# Patient Record
Sex: MALE | Race: WHITE | NOT HISPANIC OR LATINO | URBAN - METROPOLITAN AREA
[De-identification: names, ages, dates, MRNs, and addresses within clinical notes are randomized per-mention and may not be internally consistent; named-entity substitution may affect disease eponyms.]

---

## 2025-04-21 ENCOUNTER — APPOINTMENT (OUTPATIENT)
Dept: RADIOLOGY | Facility: HOSPITAL | Age: 35
End: 2025-04-21
Payer: COMMERCIAL

## 2025-04-21 ENCOUNTER — HOSPITAL ENCOUNTER (INPATIENT)
Facility: HOSPITAL | Age: 35
LOS: 2 days | Discharge: HOME | End: 2025-04-23
Attending: STUDENT IN AN ORGANIZED HEALTH CARE EDUCATION/TRAINING PROGRAM | Admitting: DENTIST
Payer: COMMERCIAL

## 2025-04-21 DIAGNOSIS — K04.7 DENTAL ABSCESS: ICD-10-CM

## 2025-04-21 DIAGNOSIS — K08.89 PAIN, DENTAL: Primary | ICD-10-CM

## 2025-04-21 LAB
ALBUMIN SERPL BCP-MCNC: 4.3 G/DL (ref 3.4–5)
ALP SERPL-CCNC: 64 U/L (ref 33–120)
ALT SERPL W P-5'-P-CCNC: 16 U/L (ref 10–52)
ANION GAP SERPL CALC-SCNC: 14 MMOL/L (ref 10–20)
AST SERPL W P-5'-P-CCNC: 14 U/L (ref 9–39)
BASOPHILS # BLD AUTO: 0.05 X10*3/UL (ref 0–0.1)
BASOPHILS NFR BLD AUTO: 0.4 %
BILIRUB SERPL-MCNC: 0.5 MG/DL (ref 0–1.2)
BUN SERPL-MCNC: 11 MG/DL (ref 6–23)
CALCIUM SERPL-MCNC: 9.5 MG/DL (ref 8.6–10.6)
CHLORIDE SERPL-SCNC: 104 MMOL/L (ref 98–107)
CO2 SERPL-SCNC: 26 MMOL/L (ref 21–32)
CREAT SERPL-MCNC: 0.73 MG/DL (ref 0.5–1.3)
EGFRCR SERPLBLD CKD-EPI 2021: >90 ML/MIN/1.73M*2
EOSINOPHIL # BLD AUTO: 0.03 X10*3/UL (ref 0–0.7)
EOSINOPHIL NFR BLD AUTO: 0.2 %
ERYTHROCYTE [DISTWIDTH] IN BLOOD BY AUTOMATED COUNT: 12.9 % (ref 11.5–14.5)
GLUCOSE SERPL-MCNC: 86 MG/DL (ref 74–99)
HCT VFR BLD AUTO: 40.4 % (ref 41–52)
HGB BLD-MCNC: 14.3 G/DL (ref 13.5–17.5)
HOLD SPECIMEN: NORMAL
IMM GRANULOCYTES # BLD AUTO: 0.25 X10*3/UL (ref 0–0.7)
IMM GRANULOCYTES NFR BLD AUTO: 1.8 % (ref 0–0.9)
LYMPHOCYTES # BLD AUTO: 1.57 X10*3/UL (ref 1.2–4.8)
LYMPHOCYTES NFR BLD AUTO: 11 %
MAGNESIUM SERPL-MCNC: 2.1 MG/DL (ref 1.6–2.4)
MCH RBC QN AUTO: 29.2 PG (ref 26–34)
MCHC RBC AUTO-ENTMCNC: 35.4 G/DL (ref 32–36)
MCV RBC AUTO: 83 FL (ref 80–100)
MONOCYTES # BLD AUTO: 0.86 X10*3/UL (ref 0.1–1)
MONOCYTES NFR BLD AUTO: 6.1 %
NEUTROPHILS # BLD AUTO: 11.45 X10*3/UL (ref 1.2–7.7)
NEUTROPHILS NFR BLD AUTO: 80.5 %
NRBC BLD-RTO: 0 /100 WBCS (ref 0–0)
PLATELET # BLD AUTO: 441 X10*3/UL (ref 150–450)
POTASSIUM SERPL-SCNC: 3.9 MMOL/L (ref 3.5–5.3)
PROT SERPL-MCNC: 7.7 G/DL (ref 6.4–8.2)
RBC # BLD AUTO: 4.89 X10*6/UL (ref 4.5–5.9)
SODIUM SERPL-SCNC: 140 MMOL/L (ref 136–145)
WBC # BLD AUTO: 14.2 X10*3/UL (ref 4.4–11.3)

## 2025-04-21 PROCEDURE — 70487 CT MAXILLOFACIAL W/DYE: CPT

## 2025-04-21 PROCEDURE — 2500000001 HC RX 250 WO HCPCS SELF ADMINISTERED DRUGS (ALT 637 FOR MEDICARE OP)

## 2025-04-21 PROCEDURE — 96365 THER/PROPH/DIAG IV INF INIT: CPT

## 2025-04-21 PROCEDURE — 2500000004 HC RX 250 GENERAL PHARMACY W/ HCPCS (ALT 636 FOR OP/ED): Mod: JZ

## 2025-04-21 PROCEDURE — 96376 TX/PRO/DX INJ SAME DRUG ADON: CPT

## 2025-04-21 PROCEDURE — 83735 ASSAY OF MAGNESIUM: CPT

## 2025-04-21 PROCEDURE — 2500000004 HC RX 250 GENERAL PHARMACY W/ HCPCS (ALT 636 FOR OP/ED)

## 2025-04-21 PROCEDURE — 99285 EMERGENCY DEPT VISIT HI MDM: CPT | Mod: 25 | Performed by: STUDENT IN AN ORGANIZED HEALTH CARE EDUCATION/TRAINING PROGRAM

## 2025-04-21 PROCEDURE — 80053 COMPREHEN METABOLIC PANEL: CPT

## 2025-04-21 PROCEDURE — 0J910ZZ DRAINAGE OF FACE SUBCUTANEOUS TISSUE AND FASCIA, OPEN APPROACH: ICD-10-PCS

## 2025-04-21 PROCEDURE — 36415 COLL VENOUS BLD VENIPUNCTURE: CPT

## 2025-04-21 PROCEDURE — 85025 COMPLETE CBC W/AUTO DIFF WBC: CPT

## 2025-04-21 PROCEDURE — 99285 EMERGENCY DEPT VISIT HI MDM: CPT

## 2025-04-21 PROCEDURE — 96361 HYDRATE IV INFUSION ADD-ON: CPT

## 2025-04-21 PROCEDURE — 70491 CT SOFT TISSUE NECK W/DYE: CPT

## 2025-04-21 PROCEDURE — 96375 TX/PRO/DX INJ NEW DRUG ADDON: CPT

## 2025-04-21 PROCEDURE — 2550000001 HC RX 255 CONTRASTS: Performed by: STUDENT IN AN ORGANIZED HEALTH CARE EDUCATION/TRAINING PROGRAM

## 2025-04-21 PROCEDURE — 1210000001 HC SEMI-PRIVATE ROOM DAILY

## 2025-04-21 RX ORDER — ENOXAPARIN SODIUM 100 MG/ML
30 INJECTION SUBCUTANEOUS EVERY 12 HOURS
Status: DISCONTINUED | OUTPATIENT
Start: 2025-04-21 | End: 2025-04-23 | Stop reason: HOSPADM

## 2025-04-21 RX ORDER — TRIPROLIDINE/PSEUDOEPHEDRINE 2.5MG-60MG
600 TABLET ORAL 3 TIMES DAILY
Status: DISCONTINUED | OUTPATIENT
Start: 2025-04-21 | End: 2025-04-23 | Stop reason: HOSPADM

## 2025-04-21 RX ORDER — ONDANSETRON HYDROCHLORIDE 2 MG/ML
INJECTION, SOLUTION INTRAVENOUS
Status: COMPLETED
Start: 2025-04-21 | End: 2025-04-21

## 2025-04-21 RX ORDER — MORPHINE SULFATE 4 MG/ML
2 INJECTION INTRAVENOUS ONCE
Status: COMPLETED | OUTPATIENT
Start: 2025-04-21 | End: 2025-04-21

## 2025-04-21 RX ORDER — IBUPROFEN 600 MG/1
600 TABLET ORAL 3 TIMES DAILY
Status: DISCONTINUED | OUTPATIENT
Start: 2025-04-21 | End: 2025-04-23 | Stop reason: HOSPADM

## 2025-04-21 RX ORDER — ONDANSETRON HYDROCHLORIDE 2 MG/ML
4 INJECTION, SOLUTION INTRAVENOUS ONCE
Status: COMPLETED | OUTPATIENT
Start: 2025-04-21 | End: 2025-04-21

## 2025-04-21 RX ORDER — KETOROLAC TROMETHAMINE 15 MG/ML
15 INJECTION, SOLUTION INTRAMUSCULAR; INTRAVENOUS ONCE
Status: COMPLETED | OUTPATIENT
Start: 2025-04-21 | End: 2025-04-21

## 2025-04-21 RX ORDER — ACETAMINOPHEN 160 MG/5ML
650 SOLUTION ORAL EVERY 6 HOURS
Status: DISCONTINUED | OUTPATIENT
Start: 2025-04-21 | End: 2025-04-23 | Stop reason: HOSPADM

## 2025-04-21 RX ORDER — DEXAMETHASONE SODIUM PHOSPHATE 10 MG/ML
10 INJECTION INTRAMUSCULAR; INTRAVENOUS ONCE
Status: COMPLETED | OUTPATIENT
Start: 2025-04-21 | End: 2025-04-21

## 2025-04-21 RX ORDER — MORPHINE SULFATE 4 MG/ML
4 INJECTION INTRAVENOUS ONCE
Status: COMPLETED | OUTPATIENT
Start: 2025-04-21 | End: 2025-04-21

## 2025-04-21 RX ORDER — ACETAMINOPHEN 325 MG/1
650 TABLET ORAL EVERY 6 HOURS
Status: DISCONTINUED | OUTPATIENT
Start: 2025-04-21 | End: 2025-04-23 | Stop reason: HOSPADM

## 2025-04-21 RX ORDER — MORPHINE SULFATE 4 MG/ML
INJECTION INTRAVENOUS
Status: COMPLETED
Start: 2025-04-21 | End: 2025-04-21

## 2025-04-21 RX ADMIN — ACETAMINOPHEN 650 MG: 325 TABLET, FILM COATED ORAL at 21:33

## 2025-04-21 RX ADMIN — DEXAMETHASONE SODIUM PHOSPHATE 8 MG: 4 INJECTION, SOLUTION INTRA-ARTICULAR; INTRALESIONAL; INTRAMUSCULAR; INTRAVENOUS; SOFT TISSUE at 21:32

## 2025-04-21 RX ADMIN — SODIUM CHLORIDE, SODIUM LACTATE, POTASSIUM CHLORIDE, AND CALCIUM CHLORIDE 1000 ML: .6; .31; .03; .02 INJECTION, SOLUTION INTRAVENOUS at 13:07

## 2025-04-21 RX ADMIN — MORPHINE SULFATE 2 MG: 4 INJECTION, SOLUTION INTRAMUSCULAR; INTRAVENOUS at 15:36

## 2025-04-21 RX ADMIN — ONDANSETRON 4 MG: 2 INJECTION INTRAMUSCULAR; INTRAVENOUS at 18:12

## 2025-04-21 RX ADMIN — IOHEXOL 75 ML: 350 INJECTION, SOLUTION INTRAVENOUS at 14:11

## 2025-04-21 RX ADMIN — AMPICILLIN SODIUM AND SULBACTAM SODIUM 3 G: 2; 1 INJECTION, POWDER, FOR SOLUTION INTRAMUSCULAR; INTRAVENOUS at 21:32

## 2025-04-21 RX ADMIN — DEXAMETHASONE SODIUM PHOSPHATE 10 MG: 10 INJECTION INTRAMUSCULAR; INTRAVENOUS at 13:14

## 2025-04-21 RX ADMIN — IBUPROFEN 600 MG: 600 TABLET ORAL at 21:33

## 2025-04-21 RX ADMIN — KETOROLAC TROMETHAMINE 15 MG: 15 INJECTION, SOLUTION INTRAMUSCULAR; INTRAVENOUS at 13:14

## 2025-04-21 RX ADMIN — MORPHINE SULFATE 4 MG: 4 INJECTION INTRAVENOUS at 18:12

## 2025-04-21 RX ADMIN — ONDANSETRON HYDROCHLORIDE 4 MG: 2 INJECTION, SOLUTION INTRAVENOUS at 18:12

## 2025-04-21 RX ADMIN — ENOXAPARIN SODIUM 30 MG: 100 INJECTION SUBCUTANEOUS at 21:33

## 2025-04-21 RX ADMIN — AMPICILLIN SODIUM AND SULBACTAM SODIUM 3 G: 2; 1 INJECTION, POWDER, FOR SOLUTION INTRAMUSCULAR; INTRAVENOUS at 15:36

## 2025-04-21 ASSESSMENT — ENCOUNTER SYMPTOMS
TROUBLE SWALLOWING: 0
CHILLS: 0
FEVER: 0
SORE THROAT: 0
FACIAL SWELLING: 1
SHORTNESS OF BREATH: 0

## 2025-04-21 ASSESSMENT — PAIN - FUNCTIONAL ASSESSMENT
PAIN_FUNCTIONAL_ASSESSMENT: 0-10

## 2025-04-21 ASSESSMENT — PAIN SCALES - GENERAL
PAINLEVEL_OUTOF10: 0 - NO PAIN
PAINLEVEL_OUTOF10: 5 - MODERATE PAIN
PAINLEVEL_OUTOF10: 2
PAINLEVEL_OUTOF10: 4

## 2025-04-21 ASSESSMENT — COLUMBIA-SUICIDE SEVERITY RATING SCALE - C-SSRS
1. IN THE PAST MONTH, HAVE YOU WISHED YOU WERE DEAD OR WISHED YOU COULD GO TO SLEEP AND NOT WAKE UP?: NO
6. HAVE YOU EVER DONE ANYTHING, STARTED TO DO ANYTHING, OR PREPARED TO DO ANYTHING TO END YOUR LIFE?: NO
2. HAVE YOU ACTUALLY HAD ANY THOUGHTS OF KILLING YOURSELF?: NO

## 2025-04-21 ASSESSMENT — PAIN DESCRIPTION - LOCATION
LOCATION: MOUTH
LOCATION: MOUTH

## 2025-04-21 ASSESSMENT — PAIN DESCRIPTION - PAIN TYPE
TYPE: ACUTE PAIN
TYPE: ACUTE PAIN

## 2025-04-21 NOTE — ED PROVIDER NOTES
HPI   Chief Complaint   Patient presents with    Dental Pain       35-year-old male presents for chief complaint of facial swelling.  Had dental work performed late last week for root canal.  Developed some swelling and pain afterward and was placed on antibiotic amoxicillin with Medrol Dosepak on Friday.  States that the pain and swelling increased initially and then improved after the medication but the trismus began to worsen over the last day or 2.  Much worse today with increased pain in the left submandibular area.  Denies any difficulty breathing or swallowing but states that it is frustrating not being able to open his mouth all the way.  Endorses some mild pain and swelling.  7/10 currently pain level.  Denies any chest pain or dyspnea.  No fevers or chills or myalgia.              Patient History   Medical History[1]  Surgical History[2]  Family History[3]  Social History[4]    Physical Exam   ED Triage Vitals [04/21/25 1239]   Temperature Heart Rate Respirations BP   36.7 °C (98.1 °F) 78 16 148/89      Pulse Ox Temp src Heart Rate Source Patient Position   100 % -- -- --      BP Location FiO2 (%)     -- --       Physical Exam  Vitals and nursing note reviewed.   Constitutional:       General: He is not in acute distress.     Appearance: He is well-developed.   HENT:      Head: Normocephalic and atraumatic.      Comments: Tenderness and fullness to the left submandibular area without crepitus.  He has trismus present significantly and it is difficult to visualize his mouth.  No stridor.  No adventitious lung sounds.  Speaks in full sentences without difficulty.  No drooling.  Patent airway at this point.  Eyes:      Conjunctiva/sclera: Conjunctivae normal.   Cardiovascular:      Rate and Rhythm: Normal rate and regular rhythm.      Heart sounds: No murmur heard.  Pulmonary:      Effort: Pulmonary effort is normal. No respiratory distress.      Breath sounds: Normal breath sounds.   Abdominal:       Palpations: Abdomen is soft.      Tenderness: There is no abdominal tenderness.   Musculoskeletal:         General: No swelling.      Cervical back: Neck supple.   Skin:     General: Skin is warm and dry.      Capillary Refill: Capillary refill takes less than 2 seconds.   Neurological:      Mental Status: He is alert.   Psychiatric:         Mood and Affect: Mood normal.           ED Course & MDM   Diagnoses as of 04/21/25 1817   Pain, dental   Dental abscess                 No data recorded     Ash Coma Scale Score: 15 (04/21/25 1238 : Sheryl Stauffer RN)                           Medical Decision Making  35-year-old male presents for chief complaint of facial swelling.  Had dental work performed late last week for root canal.  Developed some swelling and pain afterward and was placed on antibiotic amoxicillin with Medrol Dosepak on Friday.  States that the pain and swelling increased initially and then improved after the medication but the trismus began to worsen over the last day or 2.  Much worse today with increased pain in the left submandibular area.  Denies any difficulty breathing or swallowing but states that it is frustrating not being able to open his mouth all the way.  Endorses some mild pain and swelling.  7/10 currently pain level.  Denies any chest pain or dyspnea.  No fevers or chills or myalgia.    Vital signs reviewed, unremarkable at this time.  Patient overall appears to be in some discomfort but is in no apparent distress.  Speaks full sentences without difficulty.  Trismus present on exam but without stridor or drooling.  Diagnostic testing was performed.  Given Toradol, Decadron, LR bolus for symptom management.  Leukocytosis noted on CBC with differential.  Labs otherwise unremarkable including CMP and magnesium.  CT soft tissue neck and facial bones with IV contrast showed periapical lucency at the left mandibular second molar with cortical dehiscence of the lingual aspect of the  mandible.  There is adjacent subperiosteal abscess with in the submandibular space.  Questionable slight anterior subluxation of the TMJ, right greater than left.  [facility]FS was consulted.  Patient was given additional pain medication with morphine.  A bit later he was given more morphine and Zofran for potential nausea after [facility]FS came to assess and manipulate him.    [facility]FS were able to get back to us quickly.  Recommend admitting to their service for further antibiotics and potential OR for drainage of the abscess.  Patient in agreement with this plan.  States that overall he feels improved and has no further needs.  He remained calm and cooperative in a stable condition for the duration of my shift.  Awaiting inpatient bed.        Procedure  Procedures         [1] No past medical history on file.  [2] No past surgical history on file.  [3] No family history on file.  [4]   Social History  Tobacco Use    Smoking status: Not on file    Smokeless tobacco: Not on file   Substance Use Topics    Alcohol use: Not on file    Drug use: Not on file        Tanvir Goncalves, NILAM-CNP  04/21/25 2728

## 2025-04-21 NOTE — H&P
"History Of Present Illness  Bruce Stauffer is a 35 y.o. male presenting with left facial swelling. Patient reports hx of RCT completed 5/15. He reports increase in pain upon chewing. On 5/18 patient reported decreased pain but increase in facial swelling and opening. He denies any fever or purulent drainage. He reports fullness/swelling is closer to tongue. Patient has no difficulty breathing, trouble swallowing or managing secretions.      Past Medical History  Denies    Surgical History  Hernia repair as a child. No complications with anaesthesia.      Social History  Uses tobacco substitute daily. Denies Illicit drug use. Occasional EtOH     Allergies  NKDA    Review of Systems   Constitutional:  Negative for chills and fever.   HENT:  Positive for dental problem and facial swelling. Negative for mouth sores, sore throat and trouble swallowing.    Respiratory:  Negative for shortness of breath.         Physical Exam  HENT:      Head:      Comments: Patient has mild left sided swelling. Inferior border left mandible non-palpable. No extraoral drainage appreciated. CN7 marginal mandibular branch intact.      Nose: Nose normal.      Mouth/Throat:      Mouth: Mucous membranes are moist.      Comments: Patient has limited ABRAHAN <20mm guarding. Patient 35mm upon expansion. Patient has left sublingual swelling with palpable fluid collection along the lingual of #18. #18 has RCT prep and temporary cement. No pain to palpation of #20,19,18 to percussion or palpation. No buccal abscess or tenderness.   Eyes:      Conjunctiva/sclera: Conjunctivae normal.   Cardiovascular:      Comments: Warm and well perfused.   Pulmonary:      Comments: Breathing comfortably on room air.   Skin:     General: Skin is warm.   Neurological:      Mental Status: He is alert.          Last Recorded Vitals  Blood pressure 136/89, pulse 74, temperature 36.7 °C (98.1 °F), temperature source Temporal, resp. rate 16, height 1.753 m (5' 9\"), weight 74.8 kg " (165 lb), SpO2 98%.    Relevant Results  Results for orders placed or performed during the hospital encounter of 04/21/25 (from the past 24 hours)   CBC and Auto Differential   Result Value Ref Range    WBC 14.2 (H) 4.4 - 11.3 x10*3/uL    nRBC 0.0 0.0 - 0.0 /100 WBCs    RBC 4.89 4.50 - 5.90 x10*6/uL    Hemoglobin 14.3 13.5 - 17.5 g/dL    Hematocrit 40.4 (L) 41.0 - 52.0 %    MCV 83 80 - 100 fL    MCH 29.2 26.0 - 34.0 pg    MCHC 35.4 32.0 - 36.0 g/dL    RDW 12.9 11.5 - 14.5 %    Platelets 441 150 - 450 x10*3/uL    Neutrophils % 80.5 40.0 - 80.0 %    Immature Granulocytes %, Automated 1.8 (H) 0.0 - 0.9 %    Lymphocytes % 11.0 13.0 - 44.0 %    Monocytes % 6.1 2.0 - 10.0 %    Eosinophils % 0.2 0.0 - 6.0 %    Basophils % 0.4 0.0 - 2.0 %    Neutrophils Absolute 11.45 (H) 1.20 - 7.70 x10*3/uL    Immature Granulocytes Absolute, Automated 0.25 0.00 - 0.70 x10*3/uL    Lymphocytes Absolute 1.57 1.20 - 4.80 x10*3/uL    Monocytes Absolute 0.86 0.10 - 1.00 x10*3/uL    Eosinophils Absolute 0.03 0.00 - 0.70 x10*3/uL    Basophils Absolute 0.05 0.00 - 0.10 x10*3/uL   Magnesium   Result Value Ref Range    Magnesium 2.10 1.60 - 2.40 mg/dL   Comprehensive metabolic panel   Result Value Ref Range    Glucose 86 74 - 99 mg/dL    Sodium 140 136 - 145 mmol/L    Potassium 3.9 3.5 - 5.3 mmol/L    Chloride 104 98 - 107 mmol/L    Bicarbonate 26 21 - 32 mmol/L    Anion Gap 14 10 - 20 mmol/L    Urea Nitrogen 11 6 - 23 mg/dL    Creatinine 0.73 0.50 - 1.30 mg/dL    eGFR >90 >60 mL/min/1.73m*2    Calcium 9.5 8.6 - 10.6 mg/dL    Albumin 4.3 3.4 - 5.0 g/dL    Alkaline Phosphatase 64 33 - 120 U/L    Total Protein 7.7 6.4 - 8.2 g/dL    AST 14 9 - 39 U/L    Bilirubin, Total 0.5 0.0 - 1.2 mg/dL    ALT 16 10 - 52 U/L   SST TOP   Result Value Ref Range    Extra Tube Hold for add-ons.     Interpreted By:  Mauricio Langford  and Efrain Cruz   STUDY:  CT SOFT TISSUE NECK W IV CONTRAST; CT FACIAL BONES W IV CONTRAST;  4/21/2025 2:11 pm       INDICATION:  Signs/Symptoms:Recent dental procedure Thursday now with trismus and  submandibular swelling and pain.      COMPARISON:  None.      ACCESSION NUMBER(S):  [id number]; [id number]      ORDERING CLINICIAN:  KAYLEEN KANG      TECHNIQUE:  Thin cut axial CT images through the facial bones were obtained and  reconstructed in the coronal and sagittal plane.      Axial CT images of the neck were obtained.  The patient received 75  ML of Omnipaque 350 intravenous contrast agent. The images were  reformatted in angled axial, coronal and sagittal planes.      FINDINGS:  CT FACIAL BONES:      Orbits: The bony orbits are intact. The orbital contents are  unremarkable.      Facial Bones: There is no displaced facial bone fracture.      Mandible/Temporomandibular Joints: There is questionable slight  anterior subluxation of the bilateral temporomandibular joints,  right-greater-than-left. There is periapical lucency at the left  mandibular 2nd molar with subtle adjacent cortical dehiscence of the  lingual aspect of the mandible (series 203, image 82). There is a rim  enhancing fluid collection along the lingual aspect of the angle of  the mandible measuring 2.6 X1.7 X2.0 cm (series 302, image 161 and  series 306, image 59).      Paranasal Sinuses/Mastoids: Visualized paranasal sinuses and mastoids  are clear.          CT SOFT TISSUE NECK:      Oral Cavity, Pharynx and Larynx:  Evaluation oral cavity is limited  by streak artifact from dental hardware. Submandibular fluid  collection described above. The hypopharyngeal and laryngeal  structures are unremarkable.      Retropharyngeal and Prevertebral Soft Tissues: Unremarkable.      Lymph nodes: Scattered left-sided reactive submandibular and level 2A  lymph nodes.      Neck vessels: Bilateral neck vessels are normal in course and caliber  and appear patent.      Thyroid gland: The thyroid gland is unremarkable in size and  appearance.      Parotid and  submandibular glands: Bilateral parotid and submandibular  glands are unremarkable in appearance.      Paranasal Sinuses and Mastoids: Visualized paranasal sinuses and  bilateral mastoids are clear.      Visualized orbital structures are unremarkable.      Visualized upper lungs are clear.      Visualized cervical spine appears unremarkable.      IMPRESSION:  1. Periapical lucency at the left mandibular 2nd molar with cortical  dehiscence of the lingual aspect of the mandible. There is adjacent  subperiosteal abscess within the submandibular space.  2. Questionable slight anterior subluxation of the temporomandibular  joints, right-greater-than-left. Further evaluation of trismus may be  considered with MRI temporomandibular joint with open and closed  mouth views.      I personally reviewed the images/study and I agree with the findings  as stated by resident physician Anthony Zuniga MD. This study was  interpreted at [facility],  [place], [place].      MACRO:  None      Signed by: Mauricio Langford 4/21/2025 2:47 PM  Dictation workstation:   [id number]     Assessment & Plan  Pain, dental    Patient is a 34 y/o male who presents to ED with right sublingual > submandibular abscess after failed root canal therapy associated with tooth #18. Patient is afebrile but leukocytotic. He is hemodynamically stable with no signs of dyspnea, dysphagia or odynophagia. Plan is to admit patient with OMFS. Continue IV antibiotics, steroids and workup surgical plan.      [place] OMFS [place] [phone] with any issues/concerns.     If any issue with contacting via pager, please contact Beatrice Arenas via [employer]iku.   2nd call to contact via Haiku is Daniel Diop  3rd call via Haiku is Nguyen Arenas DMD    [facility] PGY-1         Beatrice Arenas DMD

## 2025-04-21 NOTE — ED TRIAGE NOTES
C/o not being able to open mouth properly after dental work Friday. Given antibiotics and steroids for swelling.

## 2025-04-22 LAB
ANION GAP SERPL CALC-SCNC: 12 MMOL/L (ref 10–20)
BUN SERPL-MCNC: 11 MG/DL (ref 6–23)
CALCIUM SERPL-MCNC: 9 MG/DL (ref 8.6–10.6)
CHLORIDE SERPL-SCNC: 100 MMOL/L (ref 98–107)
CO2 SERPL-SCNC: 26 MMOL/L (ref 21–32)
CREAT SERPL-MCNC: 0.63 MG/DL (ref 0.5–1.3)
EGFRCR SERPLBLD CKD-EPI 2021: >90 ML/MIN/1.73M*2
ERYTHROCYTE [DISTWIDTH] IN BLOOD BY AUTOMATED COUNT: 12.5 % (ref 11.5–14.5)
GLUCOSE SERPL-MCNC: 144 MG/DL (ref 74–99)
HCT VFR BLD AUTO: 36.6 % (ref 41–52)
HGB BLD-MCNC: 12.9 G/DL (ref 13.5–17.5)
MCH RBC QN AUTO: 29.5 PG (ref 26–34)
MCHC RBC AUTO-ENTMCNC: 35.2 G/DL (ref 32–36)
MCV RBC AUTO: 84 FL (ref 80–100)
NRBC BLD-RTO: 0 /100 WBCS (ref 0–0)
PLATELET # BLD AUTO: 441 X10*3/UL (ref 150–450)
POTASSIUM SERPL-SCNC: 4.3 MMOL/L (ref 3.5–5.3)
RBC # BLD AUTO: 4.38 X10*6/UL (ref 4.5–5.9)
SODIUM SERPL-SCNC: 134 MMOL/L (ref 136–145)
WBC # BLD AUTO: 12.1 X10*3/UL (ref 4.4–11.3)

## 2025-04-22 PROCEDURE — 87070 CULTURE OTHR SPECIMN AEROBIC: CPT

## 2025-04-22 PROCEDURE — 36415 COLL VENOUS BLD VENIPUNCTURE: CPT

## 2025-04-22 PROCEDURE — 82374 ASSAY BLOOD CARBON DIOXIDE: CPT

## 2025-04-22 PROCEDURE — 2500000001 HC RX 250 WO HCPCS SELF ADMINISTERED DRUGS (ALT 637 FOR MEDICARE OP)

## 2025-04-22 PROCEDURE — 2500000004 HC RX 250 GENERAL PHARMACY W/ HCPCS (ALT 636 FOR OP/ED): Mod: JZ

## 2025-04-22 PROCEDURE — 1210000001 HC SEMI-PRIVATE ROOM DAILY

## 2025-04-22 PROCEDURE — 85027 COMPLETE CBC AUTOMATED: CPT

## 2025-04-22 RX ORDER — LIDOCAINE HYDROCHLORIDE 10 MG/ML
20 INJECTION, SOLUTION EPIDURAL; INFILTRATION; INTRACAUDAL; PERINEURAL ONCE
Status: DISCONTINUED | OUTPATIENT
Start: 2025-04-22 | End: 2025-04-22

## 2025-04-22 RX ORDER — LIDOCAINE HYDROCHLORIDE 10 MG/ML
20 INJECTION, SOLUTION INFILTRATION; PERINEURAL ONCE
Status: DISCONTINUED | OUTPATIENT
Start: 2025-04-22 | End: 2025-04-23 | Stop reason: HOSPADM

## 2025-04-22 RX ADMIN — AMPICILLIN SODIUM AND SULBACTAM SODIUM 3 G: 2; 1 INJECTION, POWDER, FOR SOLUTION INTRAMUSCULAR; INTRAVENOUS at 20:27

## 2025-04-22 RX ADMIN — AMPICILLIN SODIUM AND SULBACTAM SODIUM 3 G: 2; 1 INJECTION, POWDER, FOR SOLUTION INTRAMUSCULAR; INTRAVENOUS at 03:13

## 2025-04-22 RX ADMIN — DEXAMETHASONE SODIUM PHOSPHATE 8 MG: 4 INJECTION, SOLUTION INTRA-ARTICULAR; INTRALESIONAL; INTRAMUSCULAR; INTRAVENOUS; SOFT TISSUE at 14:11

## 2025-04-22 RX ADMIN — ACETAMINOPHEN 650 MG: 325 TABLET, FILM COATED ORAL at 03:13

## 2025-04-22 RX ADMIN — IBUPROFEN 600 MG: 600 TABLET ORAL at 08:57

## 2025-04-22 RX ADMIN — DEXAMETHASONE SODIUM PHOSPHATE 8 MG: 4 INJECTION, SOLUTION INTRA-ARTICULAR; INTRALESIONAL; INTRAMUSCULAR; INTRAVENOUS; SOFT TISSUE at 04:40

## 2025-04-22 RX ADMIN — AMPICILLIN SODIUM AND SULBACTAM SODIUM 3 G: 2; 1 INJECTION, POWDER, FOR SOLUTION INTRAMUSCULAR; INTRAVENOUS at 08:57

## 2025-04-22 RX ADMIN — ACETAMINOPHEN 650 MG: 325 TABLET, FILM COATED ORAL at 08:55

## 2025-04-22 RX ADMIN — ACETAMINOPHEN 650 MG: 325 TABLET, FILM COATED ORAL at 20:27

## 2025-04-22 RX ADMIN — AMPICILLIN SODIUM AND SULBACTAM SODIUM 3 G: 2; 1 INJECTION, POWDER, FOR SOLUTION INTRAMUSCULAR; INTRAVENOUS at 15:31

## 2025-04-22 RX ADMIN — IBUPROFEN 600 MG: 600 TABLET ORAL at 20:27

## 2025-04-22 ASSESSMENT — PAIN SCALES - GENERAL
PAINLEVEL_OUTOF10: 5 - MODERATE PAIN
PAINLEVEL_OUTOF10: 7
PAINLEVEL_OUTOF10: 5 - MODERATE PAIN
PAINLEVEL_OUTOF10: 7
PAINLEVEL_OUTOF10: 3
PAINLEVEL_OUTOF10: 6

## 2025-04-22 ASSESSMENT — PAIN DESCRIPTION - LOCATION: LOCATION: MOUTH

## 2025-04-22 ASSESSMENT — PAIN - FUNCTIONAL ASSESSMENT
PAIN_FUNCTIONAL_ASSESSMENT: 0-10
PAIN_FUNCTIONAL_ASSESSMENT: 0-10

## 2025-04-22 NOTE — PROGRESS NOTES
Avel Stauffer is a 35 y.o. male presenting with left facial swelling. Patient reports hx of RCT completed 4/15., however, he began to steadily experience increased pain upon chewing. On 4/18 patient reported decreased pain but increase in facial swelling and opening. He denies any fever or purulent drainage. He reports fullness/swelling is closer to tongue. Patient has no difficulty breathing, trouble swallowing or managing secretions.      Past Medical History  Denies    Surgical History  Hernia repair as a child. No complications with anaesthesia.      Social History  Uses tobacco substitute daily. Denies Illicit drug use. Occasional EtOH     Allergies  NKDA    Review of Systems   Constitutional:  Negative for chills and fever.   HENT:  Positive for dental problem and facial swelling. Negative for mouth sores, sore throat and trouble swallowing.    Respiratory:  Negative for shortness of breath.         Physical Exam:  Constitutional: AAOX3, resting comfortably in bed  NEURO: Alert and oriented x3, no gross motor or sensory deficits.  CV: RRR  PULM: CTAB, breathing comfortably on RA  GI: Abd soft, nontender, nondistended,  Skin: Warm and dry. No rashes or lesions noted.  Eyes: PERRL, EOMI. clear sclera  Head/Face: Patient has mild left sided swelling. Inferior border left mandible non-palpable. No extraoral drainage appreciated. CN7 marginal mandibular branch intact.   Mouth: Patient has limited ABRAHAN <20mm guarding. Patient 35mm upon expansion. Patient has left sublingual swelling with palpable fluid collection along the lingual of #18. #18 has RCT prep and temporary cement. No pain to palpation of #20,19,18 to percussion or palpation. No buccal abscess or tenderness.   Extremities: KASPER  PSYCH: Appropriate mood and behavior        Last Recorded Vitals  Vitals:    04/21/25 2254   BP: (!) 146/93   Pulse: 87   Resp: 17   Temp:    SpO2: 95%        Relevant Results  Results for orders placed or performed during  the hospital encounter of 04/21/25 (from the past 24 hours)   CBC and Auto Differential   Result Value Ref Range    WBC 14.2 (H) 4.4 - 11.3 x10*3/uL    nRBC 0.0 0.0 - 0.0 /100 WBCs    RBC 4.89 4.50 - 5.90 x10*6/uL    Hemoglobin 14.3 13.5 - 17.5 g/dL    Hematocrit 40.4 (L) 41.0 - 52.0 %    MCV 83 80 - 100 fL    MCH 29.2 26.0 - 34.0 pg    MCHC 35.4 32.0 - 36.0 g/dL    RDW 12.9 11.5 - 14.5 %    Platelets 441 150 - 450 x10*3/uL    Neutrophils % 80.5 40.0 - 80.0 %    Immature Granulocytes %, Automated 1.8 (H) 0.0 - 0.9 %    Lymphocytes % 11.0 13.0 - 44.0 %    Monocytes % 6.1 2.0 - 10.0 %    Eosinophils % 0.2 0.0 - 6.0 %    Basophils % 0.4 0.0 - 2.0 %    Neutrophils Absolute 11.45 (H) 1.20 - 7.70 x10*3/uL    Immature Granulocytes Absolute, Automated 0.25 0.00 - 0.70 x10*3/uL    Lymphocytes Absolute 1.57 1.20 - 4.80 x10*3/uL    Monocytes Absolute 0.86 0.10 - 1.00 x10*3/uL    Eosinophils Absolute 0.03 0.00 - 0.70 x10*3/uL    Basophils Absolute 0.05 0.00 - 0.10 x10*3/uL   Magnesium   Result Value Ref Range    Magnesium 2.10 1.60 - 2.40 mg/dL   Comprehensive metabolic panel   Result Value Ref Range    Glucose 86 74 - 99 mg/dL    Sodium 140 136 - 145 mmol/L    Potassium 3.9 3.5 - 5.3 mmol/L    Chloride 104 98 - 107 mmol/L    Bicarbonate 26 21 - 32 mmol/L    Anion Gap 14 10 - 20 mmol/L    Urea Nitrogen 11 6 - 23 mg/dL    Creatinine 0.73 0.50 - 1.30 mg/dL    eGFR >90 >60 mL/min/1.73m*2    Calcium 9.5 8.6 - 10.6 mg/dL    Albumin 4.3 3.4 - 5.0 g/dL    Alkaline Phosphatase 64 33 - 120 U/L    Total Protein 7.7 6.4 - 8.2 g/dL    AST 14 9 - 39 U/L    Bilirubin, Total 0.5 0.0 - 1.2 mg/dL    ALT 16 10 - 52 U/L   SST TOP   Result Value Ref Range    Extra Tube Hold for add-ons.    CBC   Result Value Ref Range    WBC 12.1 (H) 4.4 - 11.3 x10*3/uL    nRBC 0.0 0.0 - 0.0 /100 WBCs    RBC 4.38 (L) 4.50 - 5.90 x10*6/uL    Hemoglobin 12.9 (L) 13.5 - 17.5 g/dL    Hematocrit 36.6 (L) 41.0 - 52.0 %    MCV 84 80 - 100 fL    MCH 29.5 26.0 - 34.0 pg     MCHC 35.2 32.0 - 36.0 g/dL    RDW 12.5 11.5 - 14.5 %    Platelets 441 150 - 450 x10*3/uL   Basic Metabolic Panel   Result Value Ref Range    Glucose 144 (H) 74 - 99 mg/dL    Sodium 134 (L) 136 - 145 mmol/L    Potassium 4.3 3.5 - 5.3 mmol/L    Chloride 100 98 - 107 mmol/L    Bicarbonate 26 21 - 32 mmol/L    Anion Gap 12 10 - 20 mmol/L    Urea Nitrogen 11 6 - 23 mg/dL    Creatinine 0.63 0.50 - 1.30 mg/dL    eGFR >90 >60 mL/min/1.73m*2    Calcium 9.0 8.6 - 10.6 mg/dL    Interpreted By:  Mauricio Langford,  and Efrain Cruz   STUDY:  CT SOFT TISSUE NECK W IV CONTRAST; CT FACIAL BONES W IV CONTRAST;  4/21/2025 2:11 pm      INDICATION:  Signs/Symptoms:Recent dental procedure Thursday now with trismus and  submandibular swelling and pain.      COMPARISON:  None.      ACCESSION NUMBER(S):  [id number]; [id number]      ORDERING CLINICIAN:  KAYLEEN KANG      TECHNIQUE:  Thin cut axial CT images through the facial bones were obtained and  reconstructed in the coronal and sagittal plane.      Axial CT images of the neck were obtained.  The patient received 75  ML of Omnipaque 350 intravenous contrast agent. The images were  reformatted in angled axial, coronal and sagittal planes.      FINDINGS:  CT FACIAL BONES:      Orbits: The bony orbits are intact. The orbital contents are  unremarkable.      Facial Bones: There is no displaced facial bone fracture.      Mandible/Temporomandibular Joints: There is questionable slight  anterior subluxation of the bilateral temporomandibular joints,  right-greater-than-left. There is periapical lucency at the left  mandibular 2nd molar with subtle adjacent cortical dehiscence of the  lingual aspect of the mandible (series 203, image 82). There is a rim  enhancing fluid collection along the lingual aspect of the angle of  the mandible measuring 2.6 X1.7 X2.0 cm (series 302, image 161 and  series 306, image 59).      Paranasal Sinuses/Mastoids: Visualized paranasal sinuses and mastoids  are  clear.          CT SOFT TISSUE NECK:      Oral Cavity, Pharynx and Larynx:  Evaluation oral cavity is limited  by streak artifact from dental hardware. Submandibular fluid  collection described above. The hypopharyngeal and laryngeal  structures are unremarkable.      Retropharyngeal and Prevertebral Soft Tissues: Unremarkable.      Lymph nodes: Scattered left-sided reactive submandibular and level 2A  lymph nodes.      Neck vessels: Bilateral neck vessels are normal in course and caliber  and appear patent.      Thyroid gland: The thyroid gland is unremarkable in size and  appearance.      Parotid and submandibular glands: Bilateral parotid and submandibular  glands are unremarkable in appearance.      Paranasal Sinuses and Mastoids: Visualized paranasal sinuses and  bilateral mastoids are clear.      Visualized orbital structures are unremarkable.      Visualized upper lungs are clear.      Visualized cervical spine appears unremarkable.      IMPRESSION:  1. Periapical lucency at the left mandibular 2nd molar with cortical  dehiscence of the lingual aspect of the mandible. There is adjacent  subperiosteal abscess within the submandibular space.  2. Questionable slight anterior subluxation of the temporomandibular  joints, right-greater-than-left. Further evaluation of trismus may be  considered with MRI temporomandibular joint with open and closed  mouth views.      I personally reviewed the images/study and I agree with the findings  as stated by resident physician Anthony Zuniga MD. This study was  interpreted at [facility],  [place], [place].      MACRO:  None      Signed by: Mauricio Langford 4/21/2025 2:47 PM  Dictation workstation:   [id number]     Assessment & Plan  Pain, dental    Patient is a 34 y/o male who presents to ED with right sublingual > submandibular abscess after failed root canal therapy associated with tooth #18. Patient is afebrile but leukocytotic. He is  hemodynamically stable with no signs of dyspnea, dysphagia or odynophagia. Plan is to admit patient with OMFS, patient has not yet been transferred to the floor. Continue IV antibiotics, steroids, plan will be for incision and drainage bedside.       [place] OMFS [place] [phone] with any issues/concerns. If any issue with contacting via pager, please contact Shivam Francisco via [employer].      2nd call to contact via Haiku is Daniel Diop  3rd call via Haiku is Nguyen Francisco DMD    OMFS PGY-1

## 2025-04-22 NOTE — PROGRESS NOTES
Pharmacy Medication History Review    Bruce Stauffer is a 35 y.o. male admitted for Pain, dental. Pharmacy reviewed the patient's [id number] medications and allergies for accuracy.    The list below reflects the updated PTA list.   No Known Prior to Admission Medications         The list below reflects the updated allergy list. Please review each documented allergy for additional clarification and justification.  Allergies  Reviewed by Sheryl Stauffer RN on 4/21/2025   No Known Allergies       Patient accepts M2B at discharge.     Sources used to complete the med history include:  Patient Interview  Epic Dispense Report (Pharmacy Fill History)  Chart Review; Recent/Past Encounters; [facility]  [facility] (no recent activity found)    Additional Comments:   Recent Rx's for Amoxicillin 500 mg (LF 4/18/25, #28 / 7 day) and Medrol Dose [place] (LF 4/18/25, 6 day); otherwise, no other regular/maintenance PTA Rx Medications; Pt states has used OTC Ibuprofen and Tylenol PRN.     ----------------------------------  Brandon Sandhu, Kyle, [facility]  Transitions of Care Pharmacist  Medication reconciliation complete  Please reach out via Epic Secure Chat (7p-8o) for questions,   or if no response call [phone].

## 2025-04-23 ENCOUNTER — PHARMACY VISIT (OUTPATIENT)
Dept: PHARMACY | Facility: CLINIC | Age: 35
End: 2025-04-23
Payer: COMMERCIAL

## 2025-04-23 VITALS
SYSTOLIC BLOOD PRESSURE: 121 MMHG | HEIGHT: 69 IN | RESPIRATION RATE: 16 BRPM | OXYGEN SATURATION: 98 % | TEMPERATURE: 98.1 F | WEIGHT: 165 LBS | HEART RATE: 67 BPM | DIASTOLIC BLOOD PRESSURE: 80 MMHG | BODY MASS INDEX: 24.44 KG/M2

## 2025-04-23 DIAGNOSIS — M27.2 ABSCESS OF MANDIBLE: Primary | ICD-10-CM

## 2025-04-23 PROCEDURE — 2500000004 HC RX 250 GENERAL PHARMACY W/ HCPCS (ALT 636 FOR OP/ED)

## 2025-04-23 PROCEDURE — 2500000001 HC RX 250 WO HCPCS SELF ADMINISTERED DRUGS (ALT 637 FOR MEDICARE OP)

## 2025-04-23 RX ORDER — AMOXICILLIN AND CLAVULANATE POTASSIUM 875; 125 MG/1; MG/1
875 TABLET, FILM COATED ORAL 2 TIMES DAILY
Qty: 20 TABLET | Refills: 0 | Status: SHIPPED | OUTPATIENT
Start: 2025-04-23 | End: 2025-04-23

## 2025-04-23 RX ORDER — AMOXICILLIN AND CLAVULANATE POTASSIUM 875; 125 MG/1; MG/1
875 TABLET, FILM COATED ORAL 2 TIMES DAILY
Qty: 20 TABLET | Refills: 0 | Status: SHIPPED | OUTPATIENT
Start: 2025-04-23 | End: 2025-05-03

## 2025-04-23 RX ORDER — CHLORHEXIDINE GLUCONATE ORAL RINSE 1.2 MG/ML
15 SOLUTION DENTAL AS NEEDED
Qty: 120 ML | Refills: 0 | Status: SHIPPED | OUTPATIENT
Start: 2025-04-23 | End: 2025-05-07

## 2025-04-23 RX ORDER — CHLORHEXIDINE GLUCONATE ORAL RINSE 1.2 MG/ML
15 SOLUTION DENTAL AS NEEDED
Qty: 473 ML | Refills: 0 | Status: SHIPPED | OUTPATIENT
Start: 2025-04-23 | End: 2025-04-23

## 2025-04-23 RX ADMIN — IBUPROFEN 600 MG: 600 TABLET ORAL at 09:22

## 2025-04-23 RX ADMIN — ACETAMINOPHEN 650 MG: 325 TABLET, FILM COATED ORAL at 08:25

## 2025-04-23 RX ADMIN — AMPICILLIN SODIUM AND SULBACTAM SODIUM 3 G: 2; 1 INJECTION, POWDER, FOR SOLUTION INTRAMUSCULAR; INTRAVENOUS at 08:26

## 2025-04-23 RX ADMIN — AMPICILLIN SODIUM AND SULBACTAM SODIUM 3 G: 2; 1 INJECTION, POWDER, FOR SOLUTION INTRAMUSCULAR; INTRAVENOUS at 01:10

## 2025-04-23 RX ADMIN — ACETAMINOPHEN 650 MG: 325 TABLET, FILM COATED ORAL at 01:08

## 2025-04-23 ASSESSMENT — PAIN SCALES - GENERAL: PAINLEVEL_OUTOF10: 2

## 2025-04-23 ASSESSMENT — PAIN - FUNCTIONAL ASSESSMENT: PAIN_FUNCTIONAL_ASSESSMENT: 0-10

## 2025-04-23 NOTE — SIGNIFICANT EVENT
Bruce Stauffer is a 35 y.o. male presenting with left facial swelling. Patient reports hx of RCT completed 4/15., however, he began to steadily experience increased pain upon chewing. On 4/18 patient reported decreased pain but increase in facial swelling and opening. He denies any fever or purulent drainage. Patient reports improvement in swelling, pain levels, and ABRAHAN. Patient has no difficulty breathing, trouble swallowing or managing secretions.       Physical Exam:  Constitutional: AAOX3, resting comfortably in bed  NEURO: Alert and oriented x3, no gross motor or sensory deficits.  PULM: Breathing comfortably on RA  GI: Abd soft, nontender, nondistended,  Skin: Warm and dry. No rashes or lesions noted.  Eyes: PERRL, EOMI. clear sclera  Head/Face: Patient has mild left sided swelling. Inferior border left mandible non-palpable. No extraoral drainage appreciated. CN7 marginal mandibular branch intact.   Mouth: Patient has limited ABRAHAN 20mm guarding. Patient 35mm upon expansion. Patient has left sublingual swelling with palpable fluid collection along the lingual of #18. #18 has RCT prep and temporary cement. No pain to palpation of #20,19,18 to percussion or palpation. No buccal abscess or tenderness.   Extremities: KASPER  PSYCH: Appropriate mood and behavior      Procedure:  Bedside incision and drainage performed. 1% lidocaine used at LEONID and infiltration sites at left submandibular region. Sulcular incision at lingual aspect of left mandibular teeth with creation of FTMP using #9 periosteal elevator. Approaching lingual of #18, purulence was appreciated and culture swab was taken. A temporary crown in the lower left quadrant was deemed to be loose during the procedure and removed for potential aspiration risk. Patient reports he was aware about the temporary crown being loose and has a final crown prepared by an outside dentist. Overall, patient tolerated this procedure well.       OMFS to continue to manage while  inpatient.

## 2025-04-23 NOTE — DISCHARGE SUMMARY
Discharge Diagnosis  Pain, dental    Issues Requiring Follow-Up  Tissue culture/path    Test Results Pending At Discharge  Pending Labs       Order Current Status    Tissue/Wound Culture/Smear Preliminary result            Hospital Course   Patient presented on HOD 1 with chief complaint of left facial swelling concentrated at the mandible. [place] [facility] [place]. Patient continued on IV antibiotic and decadron therapy with improvement in symptoms. On HOD 2, bedside incision and drainage of left mandible near site #18 was performed. Patient tolerated this procedure well. No acute events overnight. On HOD 3, patient was discharged home.     Pertinent Physical Exam At Time of Discharge  Physical Exam  Constitutional: AAOX3, resting comfortably in bed  NEURO: Alert and oriented x3, no gross motor or sensory deficits.  PULM: Breathing comfortably on RA  GI: Abd soft, nontender, nondistended,  Skin: Warm and dry. No rashes or lesions noted.  Eyes: PERRL, EOMI. clear sclera  Head/Face: Patient has mild left sided swelling. Inferior border left mandible non-palpable. No extraoral drainage appreciated. CN7 marginal mandibular branch intact.   Mouth: Patient has limited ABRAHAN 20mm guarding. Patient 35mm upon expansion. Patient has left sublingual swelling with palpable fluid collection along the lingual of #18. #18 has RCT prep and temporary cement. No pain to palpation of #20,19,18 to percussion or palpation. No buccal abscess or tenderness.   Extremities: KASPER  PSYCH: Appropriate mood and behavior    Home Medications     Medication List      START taking these medications     amoxicillin-clavulanate 875-125 mg tablet; Commonly known as: Augmentin;   Take 1 tablet (875 mg) by mouth 2 times a day for 10 days.   chlorhexidine 0.12 % solution; Commonly known as: Peridex; Use 15 mL in   the mouth or throat if needed for wound care. DO NOT SWALLOW       Outpatient Follow-Up  Follow up will  occur at [facility] outpatient clinic located at Department of Oral & Maxillofacial Surgery  [address] ([employer] [facility] [employer])  [place] [id number]    Office phone number: [phone].  Office fax number: [phone].  Team Pager: [phone].  Patients can contact the [id number] through the hospital  [phone].     Adilia Whitt, DMD

## 2025-04-23 NOTE — PROGRESS NOTES
"CHASIDY Stauffer is a 35 y.o. male presenting with left facial swelling. Patient reports hx of RCT completed 4/15., however, he began to steadily experience increased pain upon chewing. On 4/18 patient reported decreased pain but increase in facial swelling and opening. Bedside incision and drainage of the left mandible was performed on 4/22/25. Patient reports improvement in swelling, pain levels, and ABRAHAN. Patient has no difficulty breathing, trouble swallowing or managing secretions.          O  Physical Exam  Physical Exam   Physical Exam:  Constitutional: AAOX3, resting comfortably in bed  NEURO: Alert and oriented x3, no gross motor or sensory deficits.  PULM: Breathing comfortably on RA  GI: Abd soft, nontender, nondistended,  Skin: Warm and dry. No rashes or lesions noted.  Eyes: PERRL, EOMI. clear sclera  Head/Face:   Patient has mild left sided facial swelling  Inferior border mandible palpable b/l   No extraoral drainage appreciated.   CN V and VII intact and equal b/l  Mouth:   ABRAHAN 25 mm  Site of incision and drainage at lingual tissues of #18 - no purulence or drainage appreciated even on manipulation - mild tenderness  #18 has RCT prep and temporary cement - temporary crown missing  No pain to palpation of #20,19,18 to percussion or palpation.   No buccal abscess or tenderness.   FOM soft non elevated b/l  Neck:  Soft to palpation b/l  Trachea midline  Extremities: KASPER  PSYCH: Appropriate mood and behavior     Last Recorded Vitals  Blood pressure 113/84, pulse 60, temperature 36.7 °C (98 °F), temperature source Temporal, resp. rate 16, height 1.753 m (5' 9\"), weight 74.8 kg (165 lb), SpO2 98%.    Relevant Results  Admission on 04/21/2025   Component Date Value Ref Range Status    WBC 04/21/2025 14.2 (H)  4.4 - 11.3 x10*3/uL Final    nRBC 04/21/2025 0.0  0.0 - 0.0 /100 WBCs Final    RBC 04/21/2025 4.89  4.50 - 5.90 x10*6/uL Final    Hemoglobin 04/21/2025 14.3  13.5 - 17.5 g/dL Final    Hematocrit " 04/21/2025 40.4 (L)  41.0 - 52.0 % Final    MCV 04/21/2025 83  80 - 100 fL Final    MCH 04/21/2025 29.2  26.0 - 34.0 pg Final    MCHC 04/21/2025 35.4  32.0 - 36.0 g/dL Final    RDW 04/21/2025 12.9  11.5 - 14.5 % Final    Platelets 04/21/2025 441  150 - 450 x10*3/uL Final    Neutrophils % 04/21/2025 80.5  40.0 - 80.0 % Final    Immature Granulocytes %, Automated 04/21/2025 1.8 (H)  0.0 - 0.9 % Final    Immature Granulocyte Count (IG) includes promyelocytes, myelocytes and metamyelocytes but does not include bands. Percent differential counts (%) should be interpreted in the context of the absolute cell counts (cells/UL).    Lymphocytes % 04/21/2025 11.0  13.0 - 44.0 % Final    Monocytes % 04/21/2025 6.1  2.0 - 10.0 % Final    Eosinophils % 04/21/2025 0.2  0.0 - 6.0 % Final    Basophils % 04/21/2025 0.4  0.0 - 2.0 % Final    Neutrophils Absolute 04/21/2025 11.45 (H)  1.20 - 7.70 x10*3/uL Final    Percent differential counts (%) should be interpreted in the context of the absolute cell counts (cells/uL).    Immature Granulocytes Absolute, Au* 04/21/2025 0.25  0.00 - 0.70 x10*3/uL Final    Lymphocytes Absolute 04/21/2025 1.57  1.20 - 4.80 x10*3/uL Final    Monocytes Absolute 04/21/2025 0.86  0.10 - 1.00 x10*3/uL Final    Eosinophils Absolute 04/21/2025 0.03  0.00 - 0.70 x10*3/uL Final    Basophils Absolute 04/21/2025 0.05  0.00 - 0.10 x10*3/uL Final    Magnesium 04/21/2025 2.10  1.60 - 2.40 mg/dL Final    Glucose 04/21/2025 86  74 - 99 mg/dL Final    Sodium 04/21/2025 140  136 - 145 mmol/L Final    Potassium 04/21/2025 3.9  3.5 - 5.3 mmol/L Final    Chloride 04/21/2025 104  98 - 107 mmol/L Final    Bicarbonate 04/21/2025 26  21 - 32 mmol/L Final    Anion Gap 04/21/2025 14  10 - 20 mmol/L Final    Urea Nitrogen 04/21/2025 11  6 - 23 mg/dL Final    Creatinine 04/21/2025 0.73  0.50 - 1.30 mg/dL Final    eGFR 04/21/2025 >90  >60 mL/min/1.73m*2 Final    Calculations of estimated GFR are performed using the 2021 CKD-EPI Study  Refit equation without the race variable for the IDMS-Traceable creatinine methods.  https://jasn.asnjournals.org/content/early/2021/09/22/ASN.9570492684    Calcium 04/21/2025 9.5  8.6 - 10.6 mg/dL Final    Albumin 04/21/2025 4.3  3.4 - 5.0 g/dL Final    Alkaline Phosphatase 04/21/2025 64  33 - 120 U/L Final    Total Protein 04/21/2025 7.7  6.4 - 8.2 g/dL Final    AST 04/21/2025 14  9 - 39 U/L Final    Bilirubin, Total 04/21/2025 0.5  0.0 - 1.2 mg/dL Final    ALT 04/21/2025 16  10 - 52 U/L Final    Patients treated with Sulfasalazine may generate falsely decreased results for ALT.    Extra Tube 04/21/2025 Hold for add-ons.   Final    Auto resulted.    WBC 04/22/2025 12.1 (H)  4.4 - 11.3 x10*3/uL Final    nRBC 04/22/2025 0.0  0.0 - 0.0 /100 WBCs Final    RBC 04/22/2025 4.38 (L)  4.50 - 5.90 x10*6/uL Final    Hemoglobin 04/22/2025 12.9 (L)  13.5 - 17.5 g/dL Final    Hematocrit 04/22/2025 36.6 (L)  41.0 - 52.0 % Final    MCV 04/22/2025 84  80 - 100 fL Final    MCH 04/22/2025 29.5  26.0 - 34.0 pg Final    MCHC 04/22/2025 35.2  32.0 - 36.0 g/dL Final    RDW 04/22/2025 12.5  11.5 - 14.5 % Final    Platelets 04/22/2025 441  150 - 450 x10*3/uL Final    Glucose 04/22/2025 144 (H)  74 - 99 mg/dL Final    Sodium 04/22/2025 134 (L)  136 - 145 mmol/L Final    Potassium 04/22/2025 4.3  3.5 - 5.3 mmol/L Final    MILD HEMOLYSIS DETECTED. The result may be falsely elevated due to hemolysis or other interferents. Clinical correlation is recommended. Repeat testing may be considered.    Chloride 04/22/2025 100  98 - 107 mmol/L Final    Bicarbonate 04/22/2025 26  21 - 32 mmol/L Final    Anion Gap 04/22/2025 12  10 - 20 mmol/L Final    Urea Nitrogen 04/22/2025 11  6 - 23 mg/dL Final    Creatinine 04/22/2025 0.63  0.50 - 1.30 mg/dL Final    eGFR 04/22/2025 >90  >60 mL/min/1.73m*2 Final    Calculations of estimated GFR are performed using the 2021 CKD-EPI Study Refit equation without the race variable for the IDMS-Traceable creatinine  methods.  https://jasn.asnjournals.org/content/early/2021/09/22/ASN.0369016578    Calcium 04/22/2025 9.0  8.6 - 10.6 mg/dL Final        I/O last 3 completed shifts:  In: 1099 (14.7 mL/kg) [IV Piggyback:1099]  Out: - (0 mL/kg)   Weight: 74.8 kg   I/O this shift:  In: 200 [IV Piggyback:200]  Out: -       Assessment/Plan   Bruce Stauffer is a 35 y.o. male presenting with left facial swelling. Patient shows improvement following incision and drainage, IV antibiotics, and IV decadron.         Please page OMFS at [phone] with any issues/concerns. If any issue with contacting via pager, please contact Adilia Whitt via Tello.      2nd call to contact via Haiku is Daniel Diop  3rd call via Haiku is Nguyen Whitt, DMD

## 2025-04-23 NOTE — DISCHARGE INSTRUCTIONS
DEPARTMENT OF ORAL & MAXILLOFACIAL SURGERY  DISCHARGE INSTRUCTIONS    C O N F I D E N T I A L   I N F O R M A T I O N  -------------------------------------------------------------------------------------------------------------------    Bruce Stauffer  [id number]    The following is a brief overview of your hospitalization. Some of the information contained on this summary may be confidential.  This information should be kept in your records and should be shared with your regular doctor.    Admission Date:4/21/2025 12:32 PM  Discharge Date: No discharge date for patient encounter.    Disposition:  Home    PRINCIPAL DIAGNOSIS (reason after study for this admission): left facial/mandibular swelling    Physicians:   Dr. Acosta    Operations performed while hospitalized:   Bedside incision and drainage of left mandible    Additional findings:   Pending culture/path    Treatment/wound care:   Keep area(s) clean and dry.   It is okay to shower 48 hours after time of surgery.    Do not scrub wound(s), pat dry.    Do not submerge wound(s) in standing water until seen in followup (no tub bathing, swimming, or hot tubs).    Please visually inspect your wound(s) at least once daily.  If the wound(s) are in a difficult to see location, please use a mirror or have someone else assist with visual inspection.    If you have sutures that you can see outside of the skin or staples:  Please have staples/sutures removed in our [id number] days after the date of surgery.  Do not remove the staples/sutures on your own.  Return sooner or call if wound(s) or surrounding area have increased swelling, pain, warmth, redness, or drainage that is thick, yellow and/or green.    Pending results:   Culture/path    Pain Control:    OTC Ibuprofen 600 mg q6h PRN pain  OTC Tylenol 650 mg q6h PRN pain    Activity after Discharge:   No heavy lifting, weight bearing as tolerated, no driving until mobility is returned to normal.   Do not submerge wound(s)  in standing water for 7 days after surgery (no tub bathing, swimming, or hot tubs).   Do not drive or operate heavy machinery while taking narcotic pain medications as these medications can alter perception, impair judgement, and slow reaction times.    Diet: Soft diet    Additional Instructions:   Follow up will occur at [facility] outpatient clinic located at Department of Oral & Maxillofacial Surgery  [address] ([employer] [facility] [employer] [facility])  [place] [address]    Office phone number: [phone].  Office fax number: [phone].  Team Pager: [phone].  Patients can contact the [id number] through the hospital  [phone].

## 2025-04-27 LAB
BACTERIA SPEC CULT: NORMAL
GRAM STN SPEC: NORMAL
GRAM STN SPEC: NORMAL